# Patient Record
Sex: FEMALE | Race: WHITE | NOT HISPANIC OR LATINO | Employment: FULL TIME | URBAN - METROPOLITAN AREA
[De-identification: names, ages, dates, MRNs, and addresses within clinical notes are randomized per-mention and may not be internally consistent; named-entity substitution may affect disease eponyms.]

---

## 2018-03-29 ENCOUNTER — NON-PROVIDER VISIT (OUTPATIENT)
Dept: OCCUPATIONAL MEDICINE | Facility: CLINIC | Age: 33
End: 2018-03-29

## 2018-03-29 DIAGNOSIS — Z02.1 PRE-EMPLOYMENT DRUG SCREENING: ICD-10-CM

## 2018-03-29 LAB
AMP AMPHETAMINE: NORMAL
COC COCAINE: NORMAL
INT CON NEG: NORMAL
INT CON POS: NORMAL
MET METHAMPHETAMINES: NORMAL
OPI OPIATES: NORMAL
PCP PHENCYCLIDINE: NORMAL
POC DRUG COMMENT 753798-OCCUPATIONAL HEALTH: NORMAL
THC: NORMAL

## 2018-03-29 PROCEDURE — 80305 DRUG TEST PRSMV DIR OPT OBS: CPT | Performed by: PREVENTIVE MEDICINE

## 2019-03-06 ENCOUNTER — HOSPITAL ENCOUNTER (EMERGENCY)
Facility: MEDICAL CENTER | Age: 34
End: 2019-03-07
Attending: EMERGENCY MEDICINE

## 2019-03-06 DIAGNOSIS — J06.9 VIRAL UPPER RESPIRATORY INFECTION: ICD-10-CM

## 2019-03-06 PROCEDURE — 93005 ELECTROCARDIOGRAM TRACING: CPT | Performed by: EMERGENCY MEDICINE

## 2019-03-06 PROCEDURE — 93005 ELECTROCARDIOGRAM TRACING: CPT

## 2019-03-06 PROCEDURE — 99284 EMERGENCY DEPT VISIT MOD MDM: CPT

## 2019-03-07 VITALS
BODY MASS INDEX: 21.72 KG/M2 | WEIGHT: 143.3 LBS | SYSTOLIC BLOOD PRESSURE: 131 MMHG | HEIGHT: 68 IN | OXYGEN SATURATION: 97 % | HEART RATE: 96 BPM | TEMPERATURE: 99.7 F | DIASTOLIC BLOOD PRESSURE: 94 MMHG | RESPIRATION RATE: 20 BRPM

## 2019-03-07 LAB — EKG IMPRESSION: NORMAL

## 2019-03-07 PROCEDURE — A9270 NON-COVERED ITEM OR SERVICE: HCPCS | Performed by: EMERGENCY MEDICINE

## 2019-03-07 PROCEDURE — 304561 HCHG STAT O2

## 2019-03-07 PROCEDURE — 99284 EMERGENCY DEPT VISIT MOD MDM: CPT

## 2019-03-07 PROCEDURE — 700102 HCHG RX REV CODE 250 W/ 637 OVERRIDE(OP): Performed by: EMERGENCY MEDICINE

## 2019-03-07 RX ORDER — IBUPROFEN 600 MG/1
600 TABLET ORAL ONCE
Status: COMPLETED | OUTPATIENT
Start: 2019-03-07 | End: 2019-03-07

## 2019-03-07 RX ADMIN — IBUPROFEN 600 MG: 600 TABLET ORAL at 01:45

## 2019-03-07 NOTE — ED TRIAGE NOTES
"Leonarda Ulloa  33 y.o. female  Chief Complaint   Patient presents with   • Chest Pain     X 2 days, +pleuritic +reproduceable   • Shortness of Breath     Pt is very congested, +productive cough, febrile at home       Pt amb to triage with steady gait for above complaint. EKG done in triage  Pt is alert and oriented, speaking in full sentences, follows commands and responds appropriately to questions. NAD. Resp are even and unlabored.  Pt placed in lobby. Pt educated on triage process. Pt encouraged to alert staff for any changes.  /107   Pulse 97   Temp 37 °C (98.6 °F) (Temporal)   Resp 16   Ht 1.727 m (5' 8\")   Wt 65 kg (143 lb 4.8 oz)   LMP  (LMP Unknown)   SpO2 97%   BMI 21.79 kg/m²     "

## 2019-03-07 NOTE — ED PROVIDER NOTES
"ED Provider Note    CHIEF COMPLAINT  Chief Complaint   Patient presents with   • Chest Pain     X 2 days, +pleuritic +reproduceable   • Shortness of Breath     Pt is very congested, +productive cough, febrile at home       HPI  Leonarda Ulloa is a 33 y.o. female who presents with a cough as well as chest pain.  The patient states she is been sick over the last 2 days.  She states she has a lot of facial congestion as well as chest congestion.  She has sharp chest pain with coughing as well as deep inspiration.  She does not have any pain or swelling to her lower extremities.  She does not have any risk factors from a DVT standpoint.  She states that she is otherwise healthy.  REVIEW OF SYSTEMS  See HPI for further details. All other systems are negative.     PAST MEDICAL HISTORY  No past medical history on file.    FAMILY HISTORY  [unfilled]    SOCIAL HISTORY  Social History     Social History   • Marital status: Single     Spouse name: N/A   • Number of children: N/A   • Years of education: N/A     Social History Main Topics   • Smoking status: Former Smoker   • Smokeless tobacco: Never Used   • Alcohol use Yes      Comment: social   • Drug use: No   • Sexual activity: Not on file     Other Topics Concern   • Not on file     Social History Narrative   • No narrative on file       SURGICAL HISTORY  No past surgical history on file.    CURRENT MEDICATIONS  Home Medications     Reviewed by Lee Robb R.N. (Registered Nurse) on 03/06/19 at 2243  Med List Status: <None>   Medication Last Dose Status        Patient Catracho Taking any Medications                       ALLERGIES  No Known Allergies    PHYSICAL EXAM  VITAL SIGNS: /94   Pulse 96   Temp 37.6 °C (99.7 °F) (Temporal)   Resp 20   Ht 1.727 m (5' 8\")   Wt 65 kg (143 lb 4.8 oz)   LMP  (LMP Unknown)   SpO2 97%   BMI 21.79 kg/m²  Room air O2: 92    Constitutional: Well developed, Well nourished, No acute distress, Non-toxic appearance.   HENT: " Normocephalic, Atraumatic, Bilateral tympanic membranes retracted l, Oropharynx moist, No oral exudates, Nose swollen turbinates.   Eyes: PERRLA, EOMI, Conjunctiva normal, No discharge.   Neck: Normal range of motion, No tenderness, Supple, No stridor.   Lymphatic: No lymphadenopathy noted.   Cardiovascular: Normal heart rate, Normal rhythm, No murmurs, No rubs, No gallops.   Thorax & Lungs: Symmetrically diminished throughout, no wheezing, no rhonchi, no rales, reproducible chest pain with compression.   Abdomen: Bowel sounds normal, Soft, No tenderness, No masses, No pulsatile masses.   Skin: Warm, Dry, No erythema, No rash.   Back: No tenderness, No CVA tenderness.   Extremities: Intact distal pulses, No edema, No tenderness, No cyanosis, No clubbing.   Musculoskeletal: Good range of motion in all major joints. No tenderness to palpation or major deformities noted.   Neurologic: Alert & oriented x 3, Normal motor function, Normal sensory function, No focal deficits noted.   Psychiatric: Affect normal, Judgment normal, Mood normal.     EKG  Twelve-lead EKG shows a normal sinus rhythm with a ventricular rate of 97, normal QRS, normal intervals, no ST segment elevation or depression, normal T waves.    COURSE & MEDICAL DECISION MAKING  Pertinent Labs & Imaging studies reviewed. (See chart for details)  This a 33-year-old female who presents the [facility] with a suspected viral upper respiratory infection.  She does have some chest wall pain that is clearly reproducible.  Based on the chest pain in triage she did receive an EKG via protocol.  This does not show any evidence of ischemia and based on her age this would be very unlikely.  My exam is supportive of a viral upper respiratory infection.  Therefore the patient be treated supportively.  She will return if she is not better in 5-7 days and sooner if worse.    FINAL IMPRESSION  1.  Viral upper respiratory infection  2.  Chest wall pain          Electronically signed by: Kendall Caba, 3/7/2019 1:12 AM

## 2023-03-24 ENCOUNTER — HOSPITAL ENCOUNTER (EMERGENCY)
Dept: HOSPITAL 54 - ER | Age: 38
Discharge: HOME | End: 2023-03-24
Payer: SELF-PAY

## 2023-03-24 VITALS — WEIGHT: 145 LBS | BODY MASS INDEX: 21.98 KG/M2 | HEIGHT: 68 IN

## 2023-03-24 VITALS — DIASTOLIC BLOOD PRESSURE: 68 MMHG | SYSTOLIC BLOOD PRESSURE: 131 MMHG

## 2023-03-24 DIAGNOSIS — L03.116: Primary | ICD-10-CM

## 2023-03-24 DIAGNOSIS — L03.115: ICD-10-CM

## 2023-03-24 DIAGNOSIS — Z88.0: ICD-10-CM
